# Patient Record
Sex: FEMALE | Race: WHITE
[De-identification: names, ages, dates, MRNs, and addresses within clinical notes are randomized per-mention and may not be internally consistent; named-entity substitution may affect disease eponyms.]

---

## 2020-11-03 ENCOUNTER — HOSPITAL ENCOUNTER (EMERGENCY)
Dept: HOSPITAL 56 - MW.ED | Age: 50
LOS: 1 days | Discharge: PSYCHIATRIC OR SUBSTANCE USE FACILITY | End: 2020-11-04
Payer: COMMERCIAL

## 2020-11-03 DIAGNOSIS — F29: Primary | ICD-10-CM

## 2020-11-03 DIAGNOSIS — Z20.828: ICD-10-CM

## 2020-11-03 LAB
BUN SERPL-MCNC: 18 MG/DL (ref 7–18)
CHLORIDE SERPL-SCNC: 103 MMOL/L (ref 98–107)
CO2 SERPL-SCNC: 19.7 MMOL/L (ref 21–32)
GLUCOSE SERPL-MCNC: 115 MG/DL (ref 74–106)
POTASSIUM SERPL-SCNC: 3.6 MMOL/L (ref 3.5–5.1)
SODIUM SERPL-SCNC: 138 MMOL/L (ref 136–145)

## 2020-11-03 PROCEDURE — 80048 BASIC METABOLIC PNL TOTAL CA: CPT

## 2020-11-03 PROCEDURE — 96372 THER/PROPH/DIAG INJ SC/IM: CPT

## 2020-11-03 PROCEDURE — 96374 THER/PROPH/DIAG INJ IV PUSH: CPT

## 2020-11-03 PROCEDURE — 80053 COMPREHEN METABOLIC PANEL: CPT

## 2020-11-03 PROCEDURE — 84484 ASSAY OF TROPONIN QUANT: CPT

## 2020-11-03 PROCEDURE — 87635 SARS-COV-2 COVID-19 AMP PRB: CPT

## 2020-11-03 PROCEDURE — 71045 X-RAY EXAM CHEST 1 VIEW: CPT

## 2020-11-03 PROCEDURE — 80307 DRUG TEST PRSMV CHEM ANLYZR: CPT

## 2020-11-03 PROCEDURE — 85379 FIBRIN DEGRADATION QUANT: CPT

## 2020-11-03 PROCEDURE — U0002 COVID-19 LAB TEST NON-CDC: HCPCS

## 2020-11-03 PROCEDURE — 80305 DRUG TEST PRSMV DIR OPT OBS: CPT

## 2020-11-03 PROCEDURE — 81001 URINALYSIS AUTO W/SCOPE: CPT

## 2020-11-03 PROCEDURE — 82550 ASSAY OF CK (CPK): CPT

## 2020-11-03 PROCEDURE — 99285 EMERGENCY DEPT VISIT HI MDM: CPT

## 2020-11-03 PROCEDURE — 84702 CHORIONIC GONADOTROPIN TEST: CPT

## 2020-11-03 PROCEDURE — 85025 COMPLETE CBC W/AUTO DIFF WBC: CPT

## 2020-11-03 PROCEDURE — 84443 ASSAY THYROID STIM HORMONE: CPT

## 2020-11-03 PROCEDURE — 93005 ELECTROCARDIOGRAM TRACING: CPT

## 2020-11-03 PROCEDURE — 36415 COLL VENOUS BLD VENIPUNCTURE: CPT

## 2020-11-03 RX ADMIN — Medication PRN ML: at 22:29

## 2020-11-03 RX ADMIN — SODIUM CHLORIDE, PRESERVATIVE FREE PRN ML: 5 INJECTION INTRAVENOUS at 22:29

## 2020-11-03 RX ADMIN — LORAZEPAM ONE MG: 2 INJECTION INTRAMUSCULAR; INTRAVENOUS at 22:29

## 2020-11-03 NOTE — CR
INDICATION:



Dyspnea



TECHNIQUE:



Portable upright AP view of the chest



COMPARISON:



None



FINDINGS:



The lungs are clear. There is no sizable pleural effusion or pneumothorax. 

The cardiomediastinal silhouette is normal. The visualized osseous 

structures are unremarkable. 



IMPRESSION:



No acute intrathoracic process.



Dictated by Jarocho Whitaker MD @ Nov  3 2020 10:57PM



Signed by Dr. Jarocho Whitaker @ Nov  3 2020 10:57PM

## 2020-11-03 NOTE — EDM.PDOC
ED HPI GENERAL MEDICAL PROBLEM





- General


Chief Complaint: Behavioral/Psych


Stated Complaint: SICK


Time Seen by Provider: 11/03/20 22:10





- History of Present Illness


INITIAL COMMENTS - FREE TEXT/NARRATIVE: 





CHIEF COMPLAINT(S): "I am dizzy."








HISTORY OF PRESENT ILLNESS: This is a 50-year-old woman with a recent admission 

to [facility] in [place] who comes to the emergency department

with a chief complaint of "I am dizzy."  Per EMS and police the patient was on a

plane flight back from [place].  He stated that the patient locked 

herself in the airplane bathroom.  Upon landing it approximately took 1 hour to 

get her out of the restroom.  Upon speaking to the patient she stated that 

Lorenzo Snowden and [place] told her to lock herself in the restroom and take off

her clothes.  She stated that a chip was implanted in her right groin and that 

they were telling her to do this.  Per the patient: She states that she is 

currently dizzy but cannot explain further as to what this means.  She is alert 

and oriented x4.  She denies any chest pain but states that she does have some 

shortness of breath.  She denies any cough, runny nose, congestion.  She states 

that she was recently tested for coronavirus and was negative.  She denies any 

other symptoms including abdominal pain, nausea, vomiting.  She denies any use 

of illicit substances.  She denies any other symptoms.  She states that she has 

been going under a lot of stress secondary to a divorce that is in process.





 


REVIEW OF SYSTEMS: 





Constitutional: Denies fever, chills.


Eyes: Denies eye pain


Ears, Nose, Mouth, & Throat: Denies earache


Cardiovascular: Denies chest pain


Respiratory: Denies shortness of breath


Gastrointestinal: Denies Nausea, vomiting, diarrhea, hematochezia.


Genitourinary: Denies hematuria


Skin:Denies a rash


Neurological: Positive for dizziness.  Denies blurred vision, diplopia, 

numbness, tingling, weakness


Psychiatric: Positive for psychiatric disorder








PAST MEDICAL HISTORY: As per history of present illness and as reviewed below 

otherwise noncontributory.





SURGICAL HISTORY: As per history of present illness and as reviewed below 

otherwise noncontributory.





LMP: Does not recall





SOCIAL HISTORY: Denies any tobacco use, alcohol use, marijuana use, cocaine use,

or heroin use.  Lives with her .





FAMILY HISTORY: No family history of psychiatric disorder








EXAMINATION OF ORGAN SYSTEMS/BODY AREAS: 





Constitutional: Blood pressure is 105/87, heart rate 99, respiratory rate 16 

with an oxygen saturation 100% on room air.  Temperature 36.9


General: Middle-aged woman who appears visibly anxious


Psychiatric: Appears anxious and is requesting CAT scan and IV placement.  She 

does seem to be responding to internal stimuli


Eyes: No scleral icterus or conjunctival erythema pupils are equal round 

reactive to light.  Extraocular movements are intact.  No vertical or horizontal

nystagmus.


ENMT: Moist mucous membranes. No pharyngeal erythema tongue protrudes midline


Cardiovascular: Regular, rate, and rythym.  No gallops, murmurs, or rubs.  

Bilateral upper extremity pulses symmetric and intact.  No peripheral edema.  No

JVD.


Respiratory: Lungs clear to auscultation bilaterally.  No wheezes, rales, or rho

nchi.


Gastrointestinal: Soft, non-tender, non-distended.  Normoactive bowel sounds


Genitourinary: No suprapubic tenderness


Musculoskeletal: Normal range of motion.


Skin: No lesions or abrasions.


Neurological:     Alert and oriented x4.  Cranial nerves grossly intact.  GCS of

15.  Strength and sensation grossly intact in upper and lower extremities 

bilaterally.  Gait does appear normal.








MEDICAL DECISION MAKING AND COURSE IN THE ED WITH INTERPRETATION/REVIEW OF 

DIAGNOSTIC STUDIES: This is a 50-year-old woman with a recent admission at a 

psychiatric hospital in [place] who comes to the emergency department 

with chief complaint of dizziness who appears to be responding to internal 

stimuli.  At this time I do believe the patient is experiencing psychosis 

however given the patient's age we will undergo a medical clearance.  Given the 

patient's dizziness will obtain an EKG, D-dimer, and chest x-ray.  Will obtain 

CBC, CMP, urine drug screen.  We will place the patient on cardiac monitoring 

and pulse oximetry.





I did contact the patient's mother who was in the emergency department via 

telephone.  She was on the flight with her daughter when this occurred.  She 

states that she was just discharged on Monday from [facility] in [place] and was started on a medication that she does not recall.  She states 

that they flew home and during the flight she noticed that her daughter seem to 

be hearing voices and she was talking to her and asked if she was okay and she 

pulled her hand away.  She states that she then needed to go use the restroom 

and then was found to be in the airplane restroom with the door locked.  She 

stated that during the flight they tried to get her out of the restroom however 

the patient was noncooperative.  Upon landing police and fire department did 

attempt to help get her out of the restroom however she kept stating "mom get in

a fetal position."  "I have got my hands up."  The mother also states that she 

was talking about this website that she does not recall but that young kids play

and end up committing suicide and she told her mother that they had stolen her 

identity and were stalking her and placed a chip in her body.  The mother states

that it took approximately 20 to 30 minutes for the police and fire department 

to open the door and upon opening the door they did have to wrap her in a 

blanket.  Mother states she does not have a history of psychosis but during 

coronavirus and the divorce with her  she has developed these auditory 

hallucinations.





Twelve-lead EKG interpreted by myself.  Sinus tachycardia at a rate of 103beats 

per minute.Left axis. NC interval is 138ms. QRS duration is 80ms. ST segments 

are normal without elevations or depressions.  No Q waves present.  Hypertrophy 

not noted.  There are T wave inversions in leads III, aVF, V3 and an occasional 

PVC.  No prior EKGs are in the system.  Interpretation: Sinus tachycardia with 

nonspecific T wave inversions and occasional PVC





Laboratory: CBC is unremarkable.  D-dimer is negative.  CMP reveals non anion 

gap metabolic acidosis with a bicarbonate of 19.7.  There is an acute kidney 

injury with a creatinine of 1.3.  Troponin x1 is negative.  TSH is normal.  hCG 

is negative.  UDS is negative.  Covid is negative.  Serum alcohol is negative.





Urinalysis was a clean catch and was negative for leukocyte esterase, negative 

for nitrites, and small for blood. WBC.  RBC was 0.  Trace ketones.  

Interpretation: Ketonuria





The radiological images were viewed by myself along with reading the report from

the radiologist.


Chest x-ray does not reveal any acute cardiopulmonary process.





After period of observation the patient continued to remain stable.  The 

patient's repeat troponin was negative and her BMP returned to normal.  At this 

time I do believe the patient is a danger to herself given her continued 

psychosis therefore at this time I did place the patient on a 24-hour hold and 

filled out the appropriate paperwork.





At this time given that we do not have the inpatient psychiatric unit I did 

contact [facility] in [place] and they did not have any beds.  Therefore I 

contacted [facility] in [place] who also did not have any beds.  Therefore I

contacted [facility] in [place] who did have a bed.  I did have a 

discussion with Dr. Russell who accepted the patient.  The patient will be 

transferred via ambulance.








DISPOSITION: The patient was transferred to [facility] in [facility] 





CONDITION: Serious





PROCEDURES: None





FINAL IMPRESSION(S)/DIAGNOSES: 





1.  Acute psychosis


2.  Acute metabolic acidosis, resolved


3.  Acute kidney injury, resolved





 





Jose R Lao M.D.





- Related Data


                                    Allergies











Allergy/AdvReac Type Severity Reaction Status Date / Time


 


No Known Allergies Allergy   Verified 11/03/20 22:23











Home Meds: 


                                    Home Meds





risperiDONE [Risperdal]  11/03/20 [History]











Past Medical History





- Infectious Disease History


Infectious Disease History: Reports: Chicken Pox





Social & Family History





- Tobacco Use


Tobacco Use Status *Q: Never Tobacco User





- Recreational Drug Use


Recreational Drug Use: No





ED ROS GENERAL





- Review of Systems


Review Of Systems: See Below





ED EXAM, GENERAL





- Physical Exam


Exam: See Below





Course





- Vital Signs


Last Recorded V/S: 


                                Last Vital Signs











Temp  36.4 C   11/04/20 04:15


 


Pulse  60   11/04/20 04:15


 


Resp  14   11/04/20 04:15


 


BP  109/66   11/04/20 04:15


 


Pulse Ox  99   11/04/20 04:15














- Orders/Labs/Meds


Orders: 


                               Active Orders 24 hr











 Category Date Time Status


 


 Saline Lock Insert [OM.PC] Stat Oth  11/03/20 22:11 Ordered











Labs: 


                                Laboratory Tests











  11/03/20 11/03/20 11/03/20 Range/Units





  22:13 22:13 22:13 


 


WBC  10.17    (4.0-11.0)  K/uL


 


RBC  4.71    (4.30-5.90)  M/uL


 


Hgb  15.0    (12.0-16.0)  g/dL


 


Hct  44.5    (36.0-46.0)  %


 


MCV  94.5    (80.0-98.0)  fL


 


MCH  31.8    (27.0-32.0)  pg


 


MCHC  33.7    (31.0-37.0)  g/dL


 


RDW Std Deviation  44.1    (28.0-62.0)  fl


 


RDW Coeff of Tamiko  13    (11.0-15.0)  %


 


Plt Count  317    (150-400)  K/uL


 


MPV  10.90    (7.40-12.00)  fL


 


Neut % (Auto)  79.6    (48.0-80.0)  %


 


Lymph % (Auto)  9.8 L    (16.0-40.0)  %


 


Mono % (Auto)  10.3    (0.0-15.0)  %


 


Eos % (Auto)  0.1    (0.0-7.0)  %


 


Baso % (Auto)  0.2    (0.0-1.5)  %


 


Neut # (Auto)  8.1 H    (1.4-5.7)  K/uL


 


Lymph # (Auto)  1.0    (0.6-2.4)  K/uL


 


Mono # (Auto)  1.1 H    (0.0-0.8)  K/uL


 


Eos # (Auto)  0.0    (0.0-0.7)  K/uL


 


Baso # (Auto)  0.0    (0.0-0.1)  K/uL


 


Nucleated RBC %  0.0    /100WBC


 


Nucleated RBCs #  0    K/uL


 


D-Dimer, Quantitative    0.39  (0.0-0.50)  mg/L FEU


 


Sodium   138   (136-145)  mmol/L


 


Potassium   3.6   (3.5-5.1)  mmol/L


 


Chloride   103   ()  mmol/L


 


Carbon Dioxide   19.7 L   (21.0-32.0)  mmol/L


 


BUN   18   (7.0-18.0)  mg/dL


 


Creatinine   1.3 H   (0.6-1.0)  mg/dL


 


Est Cr Clr Drug Dosing   46.07   mL/min


 


Estimated GFR (MDRD)   43.2   ml/min


 


Glucose   115 H   ()  mg/dL


 


Calcium   9.3   (8.5-10.1)  mg/dL


 


Total Bilirubin   0.3   (0.2-1.0)  mg/dL


 


AST   22   (15-37)  IU/L


 


ALT   30   (14-63)  IU/L


 


Alkaline Phosphatase   71   ()  U/L


 


Creatine Kinase     ()  U/L


 


Troponin I   < 0.050   (0.000-0.056)  ng/mL


 


Total Protein   7.4   (6.4-8.2)  g/dL


 


Albumin   3.7   (3.4-5.0)  g/dL


 


Globulin   3.7   (2.6-4.0)  g/dL


 


Albumin/Globulin Ratio   1.0   (0.9-1.6)  


 


TSH 3rd Generation   0.42   (0.36-3.74)  uIU/mL


 


HCG, Quant   1.0   mIU/mL


 


Urine Color     


 


Urine Appearance     


 


Urine pH     (5.0-8.0)  


 


Ur Specific Gravity     (1.001-1.035)  


 


Urine Protein     (NEGATIVE)  mg/dL


 


Urine Glucose (UA)     (NEGATIVE)  mg/dL


 


Urine Ketones     (NEGATIVE)  mg/dL


 


Urine Occult Blood     (NEGATIVE)  


 


Urine Nitrite     (NEGATIVE)  


 


Urine Bilirubin     (NEGATIVE)  


 


Urine Urobilinogen     (<2.0)  EU/dL


 


Ur Leukocyte Esterase     (NEGATIVE)  


 


Urine RBC     (0-2/HPF)  


 


Urine WBC     (0-5/HPF)  


 


Ur Epithelial Cells     (NONE-FEW)  


 


Urine Bacteria     (NEGATIVE)  


 


Urine Mucus     (NONE-MOD)  


 


Salicylates     (0-20)  mg/dL


 


Urine Opiates Screen     (NEGATIVE)  


 


Ur Oxycodone Screen     (NEGATIVE)  


 


Urine Methadone Screen     (NEGATIVE)  


 


Acetaminophen     ug/mL


 


Ur Barbiturates Screen     (NEGATIVE)  


 


Ur Phencyclidine Scrn     (NEGATIVE)  


 


Ur Amphetamine Screen     (NEGATIVE)  


 


U Methamphetamines Scrn     (NEGATIVE)  


 


U Benzodiazepines Scrn     (NEGATIVE)  


 


U Cocaine Metab Screen     (NEGATIVE)  


 


U Marijuana (THC) Screen     (NEGATIVE)  


 


Ethyl Alcohol   < 3.0   mg/dL


 


SARS-CoV-2 RNA (HARISH)     (NEGATIVE)  














  11/03/20 11/03/20 11/03/20 Range/Units





  22:13 22:16 22:16 


 


WBC     (4.0-11.0)  K/uL


 


RBC     (4.30-5.90)  M/uL


 


Hgb     (12.0-16.0)  g/dL


 


Hct     (36.0-46.0)  %


 


MCV     (80.0-98.0)  fL


 


MCH     (27.0-32.0)  pg


 


MCHC     (31.0-37.0)  g/dL


 


RDW Std Deviation     (28.0-62.0)  fl


 


RDW Coeff of Tamiko     (11.0-15.0)  %


 


Plt Count     (150-400)  K/uL


 


MPV     (7.40-12.00)  fL


 


Neut % (Auto)     (48.0-80.0)  %


 


Lymph % (Auto)     (16.0-40.0)  %


 


Mono % (Auto)     (0.0-15.0)  %


 


Eos % (Auto)     (0.0-7.0)  %


 


Baso % (Auto)     (0.0-1.5)  %


 


Neut # (Auto)     (1.4-5.7)  K/uL


 


Lymph # (Auto)     (0.6-2.4)  K/uL


 


Mono # (Auto)     (0.0-0.8)  K/uL


 


Eos # (Auto)     (0.0-0.7)  K/uL


 


Baso # (Auto)     (0.0-0.1)  K/uL


 


Nucleated RBC %     /100WBC


 


Nucleated RBCs #     K/uL


 


D-Dimer, Quantitative     (0.0-0.50)  mg/L FEU


 


Sodium     (136-145)  mmol/L


 


Potassium     (3.5-5.1)  mmol/L


 


Chloride     ()  mmol/L


 


Carbon Dioxide     (21.0-32.0)  mmol/L


 


BUN     (7.0-18.0)  mg/dL


 


Creatinine     (0.6-1.0)  mg/dL


 


Est Cr Clr Drug Dosing     mL/min


 


Estimated GFR (MDRD)     ml/min


 


Glucose     ()  mg/dL


 


Calcium     (8.5-10.1)  mg/dL


 


Total Bilirubin     (0.2-1.0)  mg/dL


 


AST     (15-37)  IU/L


 


ALT     (14-63)  IU/L


 


Alkaline Phosphatase     ()  U/L


 


Creatine Kinase  218    ()  U/L


 


Troponin I     (0.000-0.056)  ng/mL


 


Total Protein     (6.4-8.2)  g/dL


 


Albumin     (3.4-5.0)  g/dL


 


Globulin     (2.6-4.0)  g/dL


 


Albumin/Globulin Ratio     (0.9-1.6)  


 


TSH 3rd Generation     (0.36-3.74)  uIU/mL


 


HCG, Quant     mIU/mL


 


Urine Color    YELLOW  


 


Urine Appearance    CLEAR  


 


Urine pH    7.0  (5.0-8.0)  


 


Ur Specific Gravity    1.015  (1.001-1.035)  


 


Urine Protein    NEGATIVE  (NEGATIVE)  mg/dL


 


Urine Glucose (UA)    NEGATIVE  (NEGATIVE)  mg/dL


 


Urine Ketones    TRACE H  (NEGATIVE)  mg/dL


 


Urine Occult Blood    SMALL H  (NEGATIVE)  


 


Urine Nitrite    NEGATIVE  (NEGATIVE)  


 


Urine Bilirubin    NEGATIVE  (NEGATIVE)  


 


Urine Urobilinogen    0.2  (<2.0)  EU/dL


 


Ur Leukocyte Esterase    NEGATIVE  (NEGATIVE)  


 


Urine RBC    NONE SEEN  (0-2/HPF)  


 


Urine WBC    0-1  (0-5/HPF)  


 


Ur Epithelial Cells    OCCASIONAL  (NONE-FEW)  


 


Urine Bacteria    FEW  (NEGATIVE)  


 


Urine Mucus    LIGHT  (NONE-MOD)  


 


Salicylates     (0-20)  mg/dL


 


Urine Opiates Screen   NEGATIVE   (NEGATIVE)  


 


Ur Oxycodone Screen   NEGATIVE   (NEGATIVE)  


 


Urine Methadone Screen   NEGATIVE   (NEGATIVE)  


 


Acetaminophen     ug/mL


 


Ur Barbiturates Screen   NEGATIVE   (NEGATIVE)  


 


Ur Phencyclidine Scrn   NEGATIVE   (NEGATIVE)  


 


Ur Amphetamine Screen   NEGATIVE   (NEGATIVE)  


 


U Methamphetamines Scrn   NEGATIVE   (NEGATIVE)  


 


U Benzodiazepines Scrn   NEGATIVE   (NEGATIVE)  


 


U Cocaine Metab Screen   NEGATIVE   (NEGATIVE)  


 


U Marijuana (THC) Screen   NEGATIVE   (NEGATIVE)  


 


Ethyl Alcohol     mg/dL


 


SARS-CoV-2 RNA (HARISH)     (NEGATIVE)  














  11/03/20 11/04/20 11/04/20 Range/Units





  22:35 02:05 02:05 


 


WBC     (4.0-11.0)  K/uL


 


RBC     (4.30-5.90)  M/uL


 


Hgb     (12.0-16.0)  g/dL


 


Hct     (36.0-46.0)  %


 


MCV     (80.0-98.0)  fL


 


MCH     (27.0-32.0)  pg


 


MCHC     (31.0-37.0)  g/dL


 


RDW Std Deviation     (28.0-62.0)  fl


 


RDW Coeff of Tamiko     (11.0-15.0)  %


 


Plt Count     (150-400)  K/uL


 


MPV     (7.40-12.00)  fL


 


Neut % (Auto)     (48.0-80.0)  %


 


Lymph % (Auto)     (16.0-40.0)  %


 


Mono % (Auto)     (0.0-15.0)  %


 


Eos % (Auto)     (0.0-7.0)  %


 


Baso % (Auto)     (0.0-1.5)  %


 


Neut # (Auto)     (1.4-5.7)  K/uL


 


Lymph # (Auto)     (0.6-2.4)  K/uL


 


Mono # (Auto)     (0.0-0.8)  K/uL


 


Eos # (Auto)     (0.0-0.7)  K/uL


 


Baso # (Auto)     (0.0-0.1)  K/uL


 


Nucleated RBC %     /100WBC


 


Nucleated RBCs #     K/uL


 


D-Dimer, Quantitative     (0.0-0.50)  mg/L FEU


 


Sodium   139   (136-145)  mmol/L


 


Potassium   4.2   (3.5-5.1)  mmol/L


 


Chloride   106   ()  mmol/L


 


Carbon Dioxide   23.4   (21.0-32.0)  mmol/L


 


BUN   16   (7.0-18.0)  mg/dL


 


Creatinine   1.0   (0.6-1.0)  mg/dL


 


Est Cr Clr Drug Dosing   59.89   mL/min


 


Estimated GFR (MDRD)   58.5   ml/min


 


Glucose   95   ()  mg/dL


 


Calcium   8.7   (8.5-10.1)  mg/dL


 


Total Bilirubin     (0.2-1.0)  mg/dL


 


AST     (15-37)  IU/L


 


ALT     (14-63)  IU/L


 


Alkaline Phosphatase     ()  U/L


 


Creatine Kinase     ()  U/L


 


Troponin I   < 0.050   (0.000-0.056)  ng/mL


 


Total Protein     (6.4-8.2)  g/dL


 


Albumin     (3.4-5.0)  g/dL


 


Globulin     (2.6-4.0)  g/dL


 


Albumin/Globulin Ratio     (0.9-1.6)  


 


TSH 3rd Generation     (0.36-3.74)  uIU/mL


 


HCG, Quant     mIU/mL


 


Urine Color     


 


Urine Appearance     


 


Urine pH     (5.0-8.0)  


 


Ur Specific Gravity     (1.001-1.035)  


 


Urine Protein     (NEGATIVE)  mg/dL


 


Urine Glucose (UA)     (NEGATIVE)  mg/dL


 


Urine Ketones     (NEGATIVE)  mg/dL


 


Urine Occult Blood     (NEGATIVE)  


 


Urine Nitrite     (NEGATIVE)  


 


Urine Bilirubin     (NEGATIVE)  


 


Urine Urobilinogen     (<2.0)  EU/dL


 


Ur Leukocyte Esterase     (NEGATIVE)  


 


Urine RBC     (0-2/HPF)  


 


Urine WBC     (0-5/HPF)  


 


Ur Epithelial Cells     (NONE-FEW)  


 


Urine Bacteria     (NEGATIVE)  


 


Urine Mucus     (NONE-MOD)  


 


Salicylates    1.1  (0-20)  mg/dL


 


Urine Opiates Screen     (NEGATIVE)  


 


Ur Oxycodone Screen     (NEGATIVE)  


 


Urine Methadone Screen     (NEGATIVE)  


 


Acetaminophen    <2.0  ug/mL


 


Ur Barbiturates Screen     (NEGATIVE)  


 


Ur Phencyclidine Scrn     (NEGATIVE)  


 


Ur Amphetamine Screen     (NEGATIVE)  


 


U Methamphetamines Scrn     (NEGATIVE)  


 


U Benzodiazepines Scrn     (NEGATIVE)  


 


U Cocaine Metab Screen     (NEGATIVE)  


 


U Marijuana (THC) Screen     (NEGATIVE)  


 


Ethyl Alcohol     mg/dL


 


SARS-CoV-2 RNA (HARISH)  NEGATIVE    (NEGATIVE)  











Meds: 


Medications














Discontinued Medications














Generic Name Dose Route Start Last Admin





  Trade Name Freq  PRN Reason Stop Dose Admin


 


Haloperidol Lactate  2.5 mg  11/03/20 22:24  11/03/20 22:28





  Haldol  IM  11/03/20 22:25  2.5 mg





  ONETIME ONE   Administration


 


Lactated Ringer's  1,000 mls @ 999 mls/hr  11/03/20 23:20  11/03/20 23:50





  Ringers, Lactated  IV  11/04/20 00:20  999 mls/hr





  .BOLUS ONE   Administration


 


Lorazepam  1 mg  11/03/20 22:24  11/03/20 22:29





  Ativan  IVPUSH  11/03/20 22:25  1 mg





  ONETIME ONE   Administration


 


Sodium Chloride  10 ml  11/03/20 22:10  11/03/20 22:29





  Saline Flush  FLUSH   10 ml





  ASDIRECTED PRN   Administration





  Keep Vein Open  


 


Sodium Chloride  2.5 ml  11/03/20 22:10  11/03/20 22:29





  Saline Flush  FLUSH   2.5 ml





  ASDIRECTED PRN   Administration





  Keep Vein Open  














Departure





- Departure


Time of Disposition: 04:25


Disposition: DC/Tfer to Psych Hosp[facility]


Condition: Serious


Clinical Impression: 


Psychosis


Qualifiers:


 Psychosis type: unspecified psychosis type Qualified Code(s): F29 - Unspecified

psychosis not due to a substance or known physiological condition








- Discharge Information


*PRESCRIPTION DRUG MONITORING PROGRAM REVIEWED*: No


*COPY OF PRESCRIPTION DRUG MONITORING REPORT IN PATIENT GISELLA: No


Referrals: 


PCP,None [Primary Care Provider] - 


Forms:  ED Department Discharge





Sepsis Event Note (ED)





- Evaluation


Sepsis Screening Result: No Definite Risk





- Focused Exam


Vital Signs: 


                                   Vital Signs











  Temp Pulse Resp BP Pulse Ox


 


 11/04/20 04:15  36.4 C  60  14  109/66  99


 


 11/04/20 00:02   79   99/67  95


 


 11/03/20 23:53   80  18  102/65  95


 


 11/03/20 22:10  36.9 C  99  16  105/87  100














- My Orders


Last 24 Hours: 


My Active Orders





11/03/20 22:11


Saline Lock Insert [OM.PC] Stat 














- Assessment/Plan


Last 24 Hours: 


My Active Orders





11/03/20 22:11


Saline Lock Insert [OM.PC] Stat

## 2020-11-04 LAB
APAP SERPL-MCNC: <2 UG/ML
BUN SERPL-MCNC: 16 MG/DL (ref 7–18)
CHLORIDE SERPL-SCNC: 106 MMOL/L (ref 98–107)
CO2 SERPL-SCNC: 23.4 MMOL/L (ref 21–32)
GLUCOSE SERPL-MCNC: 95 MG/DL (ref 74–106)
POTASSIUM SERPL-SCNC: 4.2 MMOL/L (ref 3.5–5.1)
SODIUM SERPL-SCNC: 139 MMOL/L (ref 136–145)